# Patient Record
Sex: FEMALE | Race: WHITE | NOT HISPANIC OR LATINO
[De-identification: names, ages, dates, MRNs, and addresses within clinical notes are randomized per-mention and may not be internally consistent; named-entity substitution may affect disease eponyms.]

---

## 2023-01-18 ENCOUNTER — NON-APPOINTMENT (OUTPATIENT)
Age: 33
End: 2023-01-18

## 2023-01-19 ENCOUNTER — ASOB RESULT (OUTPATIENT)
Age: 33
End: 2023-01-19

## 2023-01-19 ENCOUNTER — APPOINTMENT (OUTPATIENT)
Dept: ANTEPARTUM | Facility: CLINIC | Age: 33
End: 2023-01-19
Payer: COMMERCIAL

## 2023-01-19 PROCEDURE — 76801 OB US < 14 WKS SINGLE FETUS: CPT

## 2023-01-21 ENCOUNTER — TRANSCRIPTION ENCOUNTER (OUTPATIENT)
Age: 33
End: 2023-01-21

## 2023-01-26 ENCOUNTER — ASOB RESULT (OUTPATIENT)
Age: 33
End: 2023-01-26

## 2023-01-26 ENCOUNTER — APPOINTMENT (OUTPATIENT)
Dept: ANTEPARTUM | Facility: CLINIC | Age: 33
End: 2023-01-26
Payer: COMMERCIAL

## 2023-01-26 PROCEDURE — 76817 TRANSVAGINAL US OBSTETRIC: CPT

## 2023-10-16 ENCOUNTER — APPOINTMENT (OUTPATIENT)
Dept: ANTEPARTUM | Facility: CLINIC | Age: 33
End: 2023-10-16
Payer: COMMERCIAL

## 2023-10-16 ENCOUNTER — ASOB RESULT (OUTPATIENT)
Age: 33
End: 2023-10-16

## 2023-10-16 PROCEDURE — 76819 FETAL BIOPHYS PROFIL W/O NST: CPT

## 2023-10-16 PROCEDURE — 76816 OB US FOLLOW-UP PER FETUS: CPT

## 2023-11-06 ENCOUNTER — ASOB RESULT (OUTPATIENT)
Age: 33
End: 2023-11-06

## 2023-11-06 ENCOUNTER — APPOINTMENT (OUTPATIENT)
Dept: ANTEPARTUM | Facility: CLINIC | Age: 33
End: 2023-11-06
Payer: COMMERCIAL

## 2023-11-06 PROCEDURE — 76820 UMBILICAL ARTERY ECHO: CPT | Mod: 59

## 2023-11-06 PROCEDURE — 76816 OB US FOLLOW-UP PER FETUS: CPT

## 2023-11-06 PROCEDURE — 76819 FETAL BIOPHYS PROFIL W/O NST: CPT

## 2023-11-27 ENCOUNTER — ASOB RESULT (OUTPATIENT)
Age: 33
End: 2023-11-27

## 2023-11-27 ENCOUNTER — APPOINTMENT (OUTPATIENT)
Dept: ANTEPARTUM | Facility: CLINIC | Age: 33
End: 2023-11-27
Payer: COMMERCIAL

## 2023-11-27 PROCEDURE — 76820 UMBILICAL ARTERY ECHO: CPT | Mod: 59

## 2023-11-27 PROCEDURE — 76816 OB US FOLLOW-UP PER FETUS: CPT

## 2023-11-27 PROCEDURE — 76819 FETAL BIOPHYS PROFIL W/O NST: CPT

## 2023-12-10 ENCOUNTER — TRANSCRIPTION ENCOUNTER (OUTPATIENT)
Age: 33
End: 2023-12-10

## 2023-12-10 ENCOUNTER — INPATIENT (INPATIENT)
Facility: HOSPITAL | Age: 33
LOS: 3 days | Discharge: HOME | End: 2023-12-14
Attending: OBSTETRICS & GYNECOLOGY | Admitting: OBSTETRICS & GYNECOLOGY
Payer: COMMERCIAL

## 2023-12-10 VITALS
DIASTOLIC BLOOD PRESSURE: 79 MMHG | WEIGHT: 209.44 LBS | OXYGEN SATURATION: 98 % | TEMPERATURE: 98 F | RESPIRATION RATE: 17 BRPM | SYSTOLIC BLOOD PRESSURE: 124 MMHG | HEART RATE: 107 BPM | HEIGHT: 64 IN

## 2023-12-10 DIAGNOSIS — Z98.890 OTHER SPECIFIED POSTPROCEDURAL STATES: Chronic | ICD-10-CM

## 2023-12-10 LAB
BASOPHILS # BLD AUTO: 0.03 K/UL — SIGNIFICANT CHANGE UP (ref 0–0.2)
BASOPHILS NFR BLD AUTO: 0.3 % — SIGNIFICANT CHANGE UP (ref 0–2)
EOSINOPHIL # BLD AUTO: 0.1 K/UL — SIGNIFICANT CHANGE UP (ref 0–0.5)
EOSINOPHIL NFR BLD AUTO: 1 % — SIGNIFICANT CHANGE UP (ref 0–6)
HCT VFR BLD CALC: 35.6 % — SIGNIFICANT CHANGE UP (ref 34.5–45)
HGB BLD-MCNC: 12.4 G/DL — SIGNIFICANT CHANGE UP (ref 11.5–15.5)
IMM GRANULOCYTES NFR BLD AUTO: 0.5 % — SIGNIFICANT CHANGE UP (ref 0–0.9)
LYMPHOCYTES # BLD AUTO: 2.39 K/UL — SIGNIFICANT CHANGE UP (ref 1–3.3)
LYMPHOCYTES # BLD AUTO: 24 % — SIGNIFICANT CHANGE UP (ref 13–44)
MCHC RBC-ENTMCNC: 30.8 PG — SIGNIFICANT CHANGE UP (ref 27–34)
MCHC RBC-ENTMCNC: 34.8 GM/DL — SIGNIFICANT CHANGE UP (ref 32–36)
MCV RBC AUTO: 88.6 FL — SIGNIFICANT CHANGE UP (ref 80–100)
MONOCYTES # BLD AUTO: 0.87 K/UL — SIGNIFICANT CHANGE UP (ref 0–0.9)
MONOCYTES NFR BLD AUTO: 8.7 % — SIGNIFICANT CHANGE UP (ref 2–14)
NEUTROPHILS # BLD AUTO: 6.51 K/UL — SIGNIFICANT CHANGE UP (ref 1.8–7.4)
NEUTROPHILS NFR BLD AUTO: 65.5 % — SIGNIFICANT CHANGE UP (ref 43–77)
NRBC # BLD: 0 /100 WBCS — SIGNIFICANT CHANGE UP (ref 0–0)
PLATELET # BLD AUTO: 296 K/UL — SIGNIFICANT CHANGE UP (ref 150–400)
RBC # BLD: 4.02 M/UL — SIGNIFICANT CHANGE UP (ref 3.8–5.2)
RBC # FLD: 11.3 % — SIGNIFICANT CHANGE UP (ref 10.3–14.5)
WBC # BLD: 9.95 K/UL — SIGNIFICANT CHANGE UP (ref 3.8–10.5)
WBC # FLD AUTO: 9.95 K/UL — SIGNIFICANT CHANGE UP (ref 3.8–10.5)

## 2023-12-10 RX ORDER — OXYTOCIN 10 UNIT/ML
333.33 VIAL (ML) INJECTION
Qty: 20 | Refills: 0 | Status: DISCONTINUED | OUTPATIENT
Start: 2023-12-10 | End: 2023-12-11

## 2023-12-10 RX ORDER — ALBUTEROL 90 UG/1
2 AEROSOL, METERED ORAL EVERY 6 HOURS
Refills: 0 | Status: DISCONTINUED | OUTPATIENT
Start: 2023-12-10 | End: 2023-12-14

## 2023-12-10 RX ORDER — CITRIC ACID/SODIUM CITRATE 300-500 MG
15 SOLUTION, ORAL ORAL EVERY 6 HOURS
Refills: 0 | Status: DISCONTINUED | OUTPATIENT
Start: 2023-12-10 | End: 2023-12-11

## 2023-12-10 RX ORDER — SODIUM CHLORIDE 9 MG/ML
1000 INJECTION, SOLUTION INTRAVENOUS
Refills: 0 | Status: DISCONTINUED | OUTPATIENT
Start: 2023-12-10 | End: 2023-12-11

## 2023-12-10 RX ORDER — CHLORHEXIDINE GLUCONATE 213 G/1000ML
1 SOLUTION TOPICAL DAILY
Refills: 0 | Status: DISCONTINUED | OUTPATIENT
Start: 2023-12-10 | End: 2023-12-11

## 2023-12-10 RX ADMIN — SODIUM CHLORIDE 125 MILLILITER(S): 9 INJECTION, SOLUTION INTRAVENOUS at 23:54

## 2023-12-10 NOTE — OB RN PATIENT PROFILE - NS PRO DEPRESSION SCREENING Y/N1
Pt no longer a Pt here- Pt moved to [place] and has a new PCP.  Pt will contact [facility] again
no

## 2023-12-10 NOTE — OB PROVIDER H&P - HISTORY OF PRESENT ILLNESS
33y  at 40w4d presents for induction of labor for LGA. She denies regular contractions, leakage of fluid, vaginal bleeding. Endorses fetal movement.    Ante:  -spontaneous pregnancy  -NIPT/anatomy wnl, fetus just diagnosed with right pyelectasis  -GCT wnl  -no hypertensive disorders of pregnancy  -GBS negative  -EFW 4250g    Ob Hx: G1  mAB medical  G2: current  Gyn Hx: denies  PMHx: asthma, never hospitalized or intubated  PSHx: hemangioma removal from left hand  Meds: PNV, albuterol last used 6 months ago  Allergies: sulfa-childhood     Physical Exam:  T(C): 36.8 (12-10-23 @ 22:11), Max: 36.8 (12-10-23 @ 22:11)  HR: 107 (12-10-23 @ 22:11) (107 - 107)  BP: 124/79 (12-10-23 @ 22:11) (124/79 - 124/79)  RR: 17 (12-10-23 @ 22:11) (17 - 17)  SpO2: 98% (12-10-23 @ 22:11) (98% - 98%)  General: comfortable appearing  Abdomen: soft, non-tender, gravid  TAUS: cephalic presentation  SVE: 1/50/-3    FHT: 140 baseline, moderate variability, +accels, no decels  [place]: not steff    A/P  33y  at 40w4d presents for induction of labor.  -admit to labor and delivery  -induce with fischer balloon and pitocin  -GBS negative  -epidural PRN  -consents signed, all questions answered, patient expressed understanding  -discussed with senior resident Dr. Waterman  -discussed with attending Dr. Roland 33y  at 40w4d presents for induction of labor for LGA. She denies regular contractions, leakage of fluid, vaginal bleeding. Endorses fetal movement.    Ante:  -spontaneous pregnancy  -NIPT/anatomy wnl, fetus just diagnosed with right pyelectasis  -GCT wnl  -no hypertensive disorders of pregnancy  -GBS negative  -EFW 4250g    Ob Hx: G1  mAB medical  G2: current  Gyn Hx: denies  PMHx: asthma, never hospitalized or intubated  PSHx: hemangioma removal from left hand  Meds: PNV, albuterol last used 6 months ago  Allergies: sulfa-childhood     Physical Exam:  T(C): 36.8 (12-10-23 @ 22:11), Max: 36.8 (12-10-23 @ 22:11)  HR: 107 (12-10-23 @ 22:11) (107 - 107)  BP: 124/79 (12-10-23 @ 22:11) (124/79 - 124/79)  RR: 17 (12-10-23 @ 22:11) (17 - 17)  SpO2: 98% (12-10-23 @ 22:11) (98% - 98%)  General: comfortable appearing  Abdomen: soft, non-tender, gravid  TAUS: cephalic presentation  SVE: 1/50/-3    FHT: 140 baseline, moderate variability, +accels, no decels  [place]: not steff    A/P  33y  at 40w4d presents for induction of labor.  -admit to labor and delivery  -induce with fiscehr balloon and pitocin  -GBS negative  -epidural PRN  -consents signed, all questions answered, patient expressed understanding  -discussed with senior resident Dr. Waterman  -discussed with attending Dr. Roland

## 2023-12-10 NOTE — OB RN PATIENT PROFILE - NS_PRENATALSTART_OBGYN_ALL_OB
I have personally seen and examined the patient. I have collaborated with and supervised the Started first trimester

## 2023-12-10 NOTE — OB PROVIDER H&P - NSLOWPPHRISK_OBGYN_A_OB
No previous uterine incision/[place] Pregnancy/Less than or equal to 4 previous vaginal births/No known bleeding disorder/No history of postpartum hemorrhage

## 2023-12-10 NOTE — OB RN PATIENT PROFILE - FALL HARM RISK - UNIVERSAL INTERVENTIONS
Bed in lowest position, wheels locked, appropriate side rails in place/Call bell, personal items and telephone in reach/Instruct patient to call for assistance before getting out of bed or chair/Non-slip footwear when patient is out of bed/[place] to call system/Physically safe environment - no spills, clutter or unnecessary equipment/Purposeful Proactive Rounding/Room/bathroom lighting operational, light cord in reach Bed in lowest position, wheels locked, appropriate side rails in place/Call bell, personal items and telephone in reach/Instruct patient to call for assistance before getting out of bed or chair/Non-slip footwear when patient is out of bed/[place] to call system/Physically safe environment - no spills, clutter or unnecessary equipment/Purposeful Proactive Rounding/Room/bathroom lighting operational, light cord in reach

## 2023-12-11 LAB
ALBUMIN SERPL ELPH-MCNC: 3.1 G/DL — LOW (ref 3.3–5)
ALP SERPL-CCNC: 301 U/L — HIGH (ref 40–120)
ALT FLD-CCNC: 56 U/L — HIGH (ref 10–45)
ANION GAP SERPL CALC-SCNC: 12 MMOL/L — SIGNIFICANT CHANGE UP (ref 5–17)
APTT BLD: 27.1 SEC — SIGNIFICANT CHANGE UP (ref 24.5–35.6)
AST SERPL-CCNC: 31 U/L — SIGNIFICANT CHANGE UP (ref 10–40)
BASOPHILS # BLD AUTO: 0.03 K/UL — SIGNIFICANT CHANGE UP (ref 0–0.2)
BASOPHILS NFR BLD AUTO: 0.2 % — SIGNIFICANT CHANGE UP (ref 0–2)
BILIRUB SERPL-MCNC: 0.4 MG/DL — SIGNIFICANT CHANGE UP (ref 0.2–1.2)
BLD GP AB SCN SERPL QL: NEGATIVE — SIGNIFICANT CHANGE UP
BUN SERPL-MCNC: 7 MG/DL — SIGNIFICANT CHANGE UP (ref 7–23)
CALCIUM SERPL-MCNC: 9.3 MG/DL — SIGNIFICANT CHANGE UP (ref 8.4–10.5)
CHLORIDE SERPL-SCNC: 100 MMOL/L — SIGNIFICANT CHANGE UP (ref 96–108)
CO2 SERPL-SCNC: 20 MMOL/L — LOW (ref 22–31)
CREAT ?TM UR-MCNC: 79 MG/DL — SIGNIFICANT CHANGE UP
CREAT SERPL-MCNC: 0.54 MG/DL — SIGNIFICANT CHANGE UP (ref 0.5–1.3)
EGFR: 125 ML/MIN/1.73M2 — SIGNIFICANT CHANGE UP
EOSINOPHIL # BLD AUTO: 0.04 K/UL — SIGNIFICANT CHANGE UP (ref 0–0.5)
EOSINOPHIL NFR BLD AUTO: 0.3 % — SIGNIFICANT CHANGE UP (ref 0–6)
FIBRINOGEN PPP-MCNC: 573 MG/DL — HIGH (ref 200–445)
GLUCOSE SERPL-MCNC: 78 MG/DL — SIGNIFICANT CHANGE UP (ref 70–99)
HCT VFR BLD CALC: 33.7 % — LOW (ref 34.5–45)
HGB BLD-MCNC: 11.7 G/DL — SIGNIFICANT CHANGE UP (ref 11.5–15.5)
IMM GRANULOCYTES NFR BLD AUTO: 0.6 % — SIGNIFICANT CHANGE UP (ref 0–0.9)
INR BLD: 0.91 — SIGNIFICANT CHANGE UP (ref 0.85–1.18)
LDH SERPL L TO P-CCNC: 104 U/L — SIGNIFICANT CHANGE UP (ref 50–242)
LYMPHOCYTES # BLD AUTO: 1.75 K/UL — SIGNIFICANT CHANGE UP (ref 1–3.3)
LYMPHOCYTES # BLD AUTO: 13.4 % — SIGNIFICANT CHANGE UP (ref 13–44)
MCHC RBC-ENTMCNC: 31.2 PG — SIGNIFICANT CHANGE UP (ref 27–34)
MCHC RBC-ENTMCNC: 34.7 GM/DL — SIGNIFICANT CHANGE UP (ref 32–36)
MCV RBC AUTO: 89.9 FL — SIGNIFICANT CHANGE UP (ref 80–100)
MONOCYTES # BLD AUTO: 0.83 K/UL — SIGNIFICANT CHANGE UP (ref 0–0.9)
MONOCYTES NFR BLD AUTO: 6.4 % — SIGNIFICANT CHANGE UP (ref 2–14)
NEUTROPHILS # BLD AUTO: 10.29 K/UL — HIGH (ref 1.8–7.4)
NEUTROPHILS NFR BLD AUTO: 79.1 % — HIGH (ref 43–77)
NRBC # BLD: 0 /100 WBCS — SIGNIFICANT CHANGE UP (ref 0–0)
PLATELET # BLD AUTO: 272 K/UL — SIGNIFICANT CHANGE UP (ref 150–400)
POTASSIUM SERPL-MCNC: 3.8 MMOL/L — SIGNIFICANT CHANGE UP (ref 3.5–5.3)
POTASSIUM SERPL-SCNC: 3.8 MMOL/L — SIGNIFICANT CHANGE UP (ref 3.5–5.3)
PROT ?TM UR-MCNC: 49 MG/DL — HIGH (ref 0–12)
PROT SERPL-MCNC: 6.4 G/DL — SIGNIFICANT CHANGE UP (ref 6–8.3)
PROT/CREAT UR-RTO: 0.6 RATIO — HIGH (ref 0–0.2)
PROTHROM AB SERPL-ACNC: 10.4 SEC — SIGNIFICANT CHANGE UP (ref 9.5–13)
RBC # BLD: 3.75 M/UL — LOW (ref 3.8–5.2)
RBC # FLD: 11.8 % — SIGNIFICANT CHANGE UP (ref 10.3–14.5)
RH IG SCN BLD-IMP: POSITIVE — SIGNIFICANT CHANGE UP
SODIUM SERPL-SCNC: 132 MMOL/L — LOW (ref 135–145)
URATE SERPL-MCNC: 6 MG/DL — SIGNIFICANT CHANGE UP (ref 2.5–7)
WBC # BLD: 13.02 K/UL — HIGH (ref 3.8–10.5)
WBC # FLD AUTO: 13.02 K/UL — HIGH (ref 3.8–10.5)

## 2023-12-11 PROCEDURE — 88307 TISSUE EXAM BY PATHOLOGIST: CPT | Mod: 26

## 2023-12-11 DEVICE — SEPRAFILM 5 X 6": Type: IMPLANTABLE DEVICE | Status: FUNCTIONAL

## 2023-12-11 DEVICE — ARISTA 3GR: Type: IMPLANTABLE DEVICE | Status: FUNCTIONAL

## 2023-12-11 RX ORDER — SODIUM CHLORIDE 9 MG/ML
1000 INJECTION, SOLUTION INTRAVENOUS
Refills: 0 | Status: DISCONTINUED | OUTPATIENT
Start: 2023-12-11 | End: 2023-12-14

## 2023-12-11 RX ORDER — OXYTOCIN 10 UNIT/ML
333.33 VIAL (ML) INJECTION
Qty: 20 | Refills: 0 | Status: DISCONTINUED | OUTPATIENT
Start: 2023-12-11 | End: 2023-12-14

## 2023-12-11 RX ORDER — ENOXAPARIN SODIUM 100 MG/ML
40 INJECTION SUBCUTANEOUS EVERY 24 HOURS
Refills: 0 | Status: DISCONTINUED | OUTPATIENT
Start: 2023-12-12 | End: 2023-12-14

## 2023-12-11 RX ORDER — ACETAMINOPHEN 500 MG
975 TABLET ORAL
Refills: 0 | Status: DISCONTINUED | OUTPATIENT
Start: 2023-12-11 | End: 2023-12-14

## 2023-12-11 RX ORDER — ACETAMINOPHEN 500 MG
1000 TABLET ORAL ONCE
Refills: 0 | Status: COMPLETED | OUTPATIENT
Start: 2023-12-11 | End: 2023-12-11

## 2023-12-11 RX ORDER — TETANUS TOXOID, REDUCED DIPHTHERIA TOXOID AND ACELLULAR PERTUSSIS VACCINE, ADSORBED 5; 2.5; 8; 8; 2.5 [IU]/.5ML; [IU]/.5ML; UG/.5ML; UG/.5ML; UG/.5ML
0.5 SUSPENSION INTRAMUSCULAR ONCE
Refills: 0 | Status: DISCONTINUED | OUTPATIENT
Start: 2023-12-11 | End: 2023-12-14

## 2023-12-11 RX ORDER — OXYCODONE HYDROCHLORIDE 5 MG/1
5 TABLET ORAL
Refills: 0 | Status: DISCONTINUED | OUTPATIENT
Start: 2023-12-11 | End: 2023-12-14

## 2023-12-11 RX ORDER — MAGNESIUM HYDROXIDE 400 MG/1
30 TABLET, CHEWABLE ORAL
Refills: 0 | Status: DISCONTINUED | OUTPATIENT
Start: 2023-12-11 | End: 2023-12-14

## 2023-12-11 RX ORDER — CITRIC ACID/SODIUM CITRATE 300-500 MG
30 SOLUTION, ORAL ORAL ONCE
Refills: 0 | Status: COMPLETED | OUTPATIENT
Start: 2023-12-11 | End: 2023-12-11

## 2023-12-11 RX ORDER — FENTANYL/BUPIVACAINE/NS/PF 2MCG/ML-.1
250 PLASTIC BAG, INJECTION (ML) INJECTION
Refills: 0 | Status: DISCONTINUED | OUTPATIENT
Start: 2023-12-11 | End: 2023-12-14

## 2023-12-11 RX ORDER — NALOXONE HYDROCHLORIDE 4 MG/.1ML
0.1 SPRAY NASAL
Refills: 0 | Status: DISCONTINUED | OUTPATIENT
Start: 2023-12-11 | End: 2023-12-14

## 2023-12-11 RX ORDER — LANOLIN
1 OINTMENT (GRAM) TOPICAL EVERY 6 HOURS
Refills: 0 | Status: DISCONTINUED | OUTPATIENT
Start: 2023-12-11 | End: 2023-12-14

## 2023-12-11 RX ORDER — AZITHROMYCIN 500 MG/1
500 TABLET, FILM COATED ORAL ONCE
Refills: 0 | Status: COMPLETED | OUTPATIENT
Start: 2023-12-11 | End: 2023-12-11

## 2023-12-11 RX ORDER — LIDOCAINE 4 G/100G
1 CREAM TOPICAL ONCE
Refills: 0 | Status: DISCONTINUED | OUTPATIENT
Start: 2023-12-11 | End: 2023-12-11

## 2023-12-11 RX ORDER — OXYTOCIN 10 UNIT/ML
VIAL (ML) INJECTION
Qty: 30 | Refills: 0 | Status: DISCONTINUED | OUTPATIENT
Start: 2023-12-11 | End: 2023-12-11

## 2023-12-11 RX ORDER — KETOROLAC TROMETHAMINE 30 MG/ML
30 SYRINGE (ML) INJECTION EVERY 6 HOURS
Refills: 0 | Status: DISCONTINUED | OUTPATIENT
Start: 2023-12-11 | End: 2023-12-12

## 2023-12-11 RX ORDER — DEXAMETHASONE 0.5 MG/5ML
4 ELIXIR ORAL EVERY 6 HOURS
Refills: 0 | Status: DISCONTINUED | OUTPATIENT
Start: 2023-12-11 | End: 2023-12-14

## 2023-12-11 RX ORDER — IBUPROFEN 200 MG
600 TABLET ORAL EVERY 6 HOURS
Refills: 0 | Status: COMPLETED | OUTPATIENT
Start: 2023-12-11 | End: 2024-11-08

## 2023-12-11 RX ORDER — ONDANSETRON 8 MG/1
4 TABLET, FILM COATED ORAL EVERY 6 HOURS
Refills: 0 | Status: DISCONTINUED | OUTPATIENT
Start: 2023-12-11 | End: 2023-12-14

## 2023-12-11 RX ORDER — ACETAMINOPHEN 500 MG
1000 TABLET ORAL ONCE
Refills: 0 | Status: DISCONTINUED | OUTPATIENT
Start: 2023-12-11 | End: 2023-12-12

## 2023-12-11 RX ORDER — CEFAZOLIN SODIUM 1 G
2000 VIAL (EA) INJECTION ONCE
Refills: 0 | Status: COMPLETED | OUTPATIENT
Start: 2023-12-11 | End: 2023-12-11

## 2023-12-11 RX ORDER — LIDOCAINE 4 G/100G
1 CREAM TOPICAL ONCE
Refills: 0 | Status: COMPLETED | OUTPATIENT
Start: 2023-12-11 | End: 2023-12-11

## 2023-12-11 RX ORDER — DIPHENHYDRAMINE HCL 50 MG
25 CAPSULE ORAL EVERY 6 HOURS
Refills: 0 | Status: DISCONTINUED | OUTPATIENT
Start: 2023-12-11 | End: 2023-12-14

## 2023-12-11 RX ORDER — SIMETHICONE 80 MG/1
80 TABLET, CHEWABLE ORAL EVERY 4 HOURS
Refills: 0 | Status: DISCONTINUED | OUTPATIENT
Start: 2023-12-11 | End: 2023-12-14

## 2023-12-11 RX ORDER — LIDOCAINE 4 G/100G
3 CREAM TOPICAL ONCE
Refills: 0 | Status: COMPLETED | OUTPATIENT
Start: 2023-12-11 | End: 2023-12-11

## 2023-12-11 RX ORDER — OXYCODONE HYDROCHLORIDE 5 MG/1
5 TABLET ORAL ONCE
Refills: 0 | Status: DISCONTINUED | OUTPATIENT
Start: 2023-12-11 | End: 2023-12-14

## 2023-12-11 RX ADMIN — LIDOCAINE 2 PATCH: 4 CREAM TOPICAL at 18:51

## 2023-12-11 RX ADMIN — Medication 400 MILLIGRAM(S): at 14:04

## 2023-12-11 RX ADMIN — LIDOCAINE 1 PATCH: 4 CREAM TOPICAL at 16:16

## 2023-12-11 RX ADMIN — Medication 2 MILLIUNIT(S)/MIN: at 02:05

## 2023-12-11 RX ADMIN — Medication 250 MILLILITER(S): at 03:00

## 2023-12-11 RX ADMIN — Medication 1000 MILLIGRAM(S): at 20:42

## 2023-12-11 RX ADMIN — Medication 30 MILLIGRAM(S): at 23:42

## 2023-12-11 RX ADMIN — Medication 30 MILLILITER(S): at 21:20

## 2023-12-11 RX ADMIN — Medication 100 MILLIGRAM(S): at 21:19

## 2023-12-11 RX ADMIN — AZITHROMYCIN 255 MILLIGRAM(S): 500 TABLET, FILM COATED ORAL at 21:50

## 2023-12-11 NOTE — OB PROVIDER LABOR PROGRESS NOTE - ASSESSMENT
- IV Tylenol ordered for pain control; will re-attempt VE when pt is more comfortable  - Now meets criteria for gestational hypertension.  Full PEC with P/C ordered, will f/u.

## 2023-12-11 NOTE — OB PROVIDER DELIVERY SUMMARY - NSATTENDATTESTATION_OBGYN_A_OB
I was physically present and participated in this delivery.
I was physically present and participated in this delivery.

## 2023-12-11 NOTE — OB PROVIDER DELIVERY SUMMARY - NSPROVIDERDELIVERYNOTE_OBGYN_ALL_OB_FT
32 yo  s/p pC/S for maternal request after IOL at 40w5d for LGA. Patient had a viable female infant, placenta delivered spontaneously intact. Uterus closed with #1 chromic in two layers. January and sepra film used over hysterotomy site. Muscle closed with chromic. Fascia closed with 2-0 vicryl. Subq closed in two layers. Skin closed with 3-0 monocryl on curved needle. Excellent hemostasis. EBL 1000, IVF 1200, . Patient tolerated procedure well. Mother and infant in stable condition.   Dictation # [id number] 34 yo  s/p pC/S for maternal request after IOL at 40w5d for LGA. Patient had a viable female infant, placenta delivered spontaneously intact. Uterus closed with #1 chromic in two layers. January and sepra film used over hysterotomy site. Muscle closed with chromic. Fascia closed with 2-0 vicryl. Subq closed in two layers. Skin closed with 3-0 monocryl on curved needle. Excellent hemostasis. EBL 1000, IVF 1200, . Patient tolerated procedure well. Mother and infant in stable condition.   Dictation # [id number]

## 2023-12-11 NOTE — OB PROVIDER DELIVERY SUMMARY - NSSELHIDDEN_OBGYN_ALL_OB_FT
[NS_DeliveryAttending1_OBGYN_ALL_OB_FT:[id number]==],[NS_DeliveryAssist1_OBGYN_ALL_OB_FT:[id number]],[NS_DeliveryRN_OBGYN_ALL_OB_FT:[id number]] [NS_DeliveryAttending1_OBGYN_ALL_OB_FT:[id number]==],[NS_DeliveryAssist1_OBGYN_ALL_OB_FT:[id number]=],[NS_DeliveryRN_OBGYN_ALL_OB_FT:[id number]=]

## 2023-12-11 NOTE — OB PROVIDER LABOR PROGRESS NOTE - NS_SUBJECTIVE/OBJECTIVE_OBGYN_ALL_OB_FT
Patient examined for increased discomfort
SVE 6-7/80/-1
tracing notes
Patient reports pain, unable to lay flat for exam.
fischer balloon placed 80/80 to tension  SVE 2/70/-3
Pt comfortable with epidural
tracing note
Patient is in a lot of pain and is very tearful. She states that she cannot push. She has upper back pain and has a lidocaine patch but she is in too much pain when she gets into position to push. She want a  section. Discussed risks, benefits, indications, and alternatives to CS reviewed with the patient. Informed consent obtained. Patient expresses understanding and ask appropriate questions.
Patient is now comfortable with lidocaine patch.
Pt comfortable
pt c/o of upper back pain

## 2023-12-11 NOTE — OB PROVIDER LABOR PROGRESS NOTE - ASSESSMENT
unable to tolerate laying down  sat pt up  dw pt re pushing  will reexamine in  30 min  lidocain patch

## 2023-12-11 NOTE — OB PROVIDER LABOR PROGRESS NOTE - NSVAGINALEXAM_OBGYN_ALL_OB_DT
11-Dec-2023 09:25
11-Dec-2023 10:29
11-Dec-2023 19:53
11-Dec-2023 18:51
11-Dec-2023 15:45
11-Dec-2023 16:00

## 2023-12-11 NOTE — OB RN PREOPERATIVE CHECKLIST - NS PREOP CHK MONITOR ANESTHESIA CONSENT
Retinal tear and detachment warning symptoms reviewed and patient instructed to call immediately if increasing floaters, flashes, or decreasing peripheral vision. done

## 2023-12-11 NOTE — OB PROVIDER LABOR PROGRESS NOTE - NS_OBIHIFHRDETAILS_OBGYN_ALL_OB_FT
Baseline 150/mod philipp/+accel/no decel. Cat I.  [place] q 2 min Baseline 150/mod philipp/+accel/no decel. Cat I.  [place] q 2 min

## 2023-12-11 NOTE — OB RN DELIVERY SUMMARY - NS_DELIVERYATTENDING1_OBGYN_ALL_OB_FT
Patient left message that she had egd done yesterday and you were going to order carafate and it wasn't at the pharmacy  Please send it to pharmacy   Thank you
Kyra Aden DO

## 2023-12-11 NOTE — PRE-ANESTHESIA EVALUATION ADULT - NSANTHOSAYNRD_GEN_A_CORE
No. MALINDA screening performed.  STOP BANG Legend: 0-2 = LOW Risk; 3-4 = INTERMEDIATE Risk; 5-8 = HIGH Risk

## 2023-12-11 NOTE — OB PROVIDER LABOR PROGRESS NOTE - ASSESSMENT
- s/p cervical balloon  - pitocin 16mU/min  - Epidural in place  - attending in house  - consider AROM with next exam

## 2023-12-11 NOTE — OB PROVIDER LABOR PROGRESS NOTE - ASSESSMENT
34 yo  @ 40w4d undergoing IOL, suspected LGA, cat 1 tracing    fb out  titrate pit  pt repositioned to throne  will attempt arom next exam 32 yo  @ 40w4d undergoing IOL, suspected LGA, cat 1 tracing    fb out  titrate pit  pt repositioned to throne  will attempt arom next exam

## 2023-12-11 NOTE — OB PROVIDER LABOR PROGRESS NOTE - NS_OBIHICONTRACTIONPATTERNDETAILS_OBGYN_ALL_OB_FT
Contractions q2min.
ctx irregular
irregular contractions
3-4 contractions in 10 minutes
irregular contractions q2-3min
irregular contractions q2-5min
irregular ctx q3 mins
ctx q 2 minutes
regular contractions a 1-2  pt not aware of when she is steff
ctx q 2-4 min
Ctx 5 in 10. Pit @15mu.
q2 min

## 2023-12-11 NOTE — OB RN INTRAOPERATIVE NOTE - NSSELHIDDEN_OBGYN_ALL_OB_FT
[NS_DeliveryAttending1_OBGYN_ALL_OB_FT:[id number]==],[NS_DeliveryAssist1_OBGYN_ALL_OB_FT:[id number]=],[NS_DeliveryRN_OBGYN_ALL_OB_FT:[id number]] [NS_DeliveryAttending1_OBGYN_ALL_OB_FT:[id number]],[NS_DeliveryAssist1_OBGYN_ALL_OB_FT:[id number]=],[NS_DeliveryRN_OBGYN_ALL_OB_FT:[id number]=]

## 2023-12-11 NOTE — OB PROVIDER DELIVERY SUMMARY - NSLOWPPHRISK_OBGYN_A_OB
No previous uterine incision/[place] Pregnancy/Less than or equal to 4 previous vaginal births/No known bleeding disorder/No history of postpartum hemorrhage
No previous uterine incision/[place] Pregnancy/Less than or equal to 4 previous vaginal births/No known bleeding disorder/No history of postpartum hemorrhage

## 2023-12-11 NOTE — OB PROVIDER DELIVERY SUMMARY - NSPROVIDERDELIVERYNOTE_OBGYN_ALL_OB_FT
over intact perineum  oa no nuchal cord  baby emerged crying vigorously  spont delivery of placenta intact with 3 v cord  uterus examined and found to be clean  repair of 2nd degree lac with good hemostasis and cosmesis  rectum intact mother and baby doing well

## 2023-12-11 NOTE — OB RN DELIVERY SUMMARY - NSSELHIDDEN_OBGYN_ALL_OB_FT
[NS_DeliveryAttending1_OBGYN_ALL_OB_FT:[id number]==],[NS_DeliveryAssist1_OBGYN_ALL_OB_FT:[id number]],[NS_DeliveryRN_OBGYN_ALL_OB_FT:[id number]] [NS_DeliveryAttending1_OBGYN_ALL_OB_FT:[id number]==],[NS_DeliveryAssist1_OBGYN_ALL_OB_FT:[id number]=],[NS_DeliveryRN_OBGYN_ALL_OB_FT:[id number]=]

## 2023-12-11 NOTE — OB RN DELIVERY SUMMARY - NS_SEPSISRSKCALC_OBGYN_ALL_OB_FT
EOS calculated successfully. EOS Risk Factor: 0.5/1000 live births ([facility] national incidence); GA=40w5d; Temp=99.1; ROM=11.633; GBS='Negative'; Antibiotics='Broad spectrum antibiotics > 4 hrs prior to birth'   EOS calculated successfully. EOS Risk Factor: 0.5/1000 live births ([facility] national incidence); GA=40w5d; Temp=99.1; ROM=11.633; GBS='Negative'; Antibiotics='Broad spectrum antibiotics > 4 hrs prior to birth'

## 2023-12-11 NOTE — OB PROVIDER DELIVERY SUMMARY - NSMODPPHRISK_OBGYN_A_OB
Over-distended uterus: Multiple gestation, polyhydramnios, Macrosomia
Over-distended uterus: Multiple gestation, polyhydramnios, Macrosomia

## 2023-12-11 NOTE — OB PROVIDER DELIVERY SUMMARY - NSSELHIDDEN_OBGYN_ALL_OB_FT
[NS_DeliveryAttending1_OBGYN_ALL_OB_FT:[id number]] [NS_DeliveryAttending1_OBGYN_ALL_OB_FT:[id number]==]

## 2023-12-12 PROBLEM — Z00.00 ENCOUNTER FOR PREVENTIVE HEALTH EXAMINATION: Status: ACTIVE | Noted: 2023-12-12

## 2023-12-12 LAB
BASOPHILS # BLD AUTO: 0.05 K/UL — SIGNIFICANT CHANGE UP (ref 0–0.2)
BASOPHILS NFR BLD AUTO: 0.3 % — SIGNIFICANT CHANGE UP (ref 0–2)
EOSINOPHIL # BLD AUTO: 0 K/UL — SIGNIFICANT CHANGE UP (ref 0–0.5)
EOSINOPHIL NFR BLD AUTO: 0 % — SIGNIFICANT CHANGE UP (ref 0–6)
HCT VFR BLD CALC: 28.5 % — LOW (ref 34.5–45)
HGB BLD-MCNC: 9.6 G/DL — LOW (ref 11.5–15.5)
IMM GRANULOCYTES NFR BLD AUTO: 0.8 % — SIGNIFICANT CHANGE UP (ref 0–0.9)
LYMPHOCYTES # BLD AUTO: 1.48 K/UL — SIGNIFICANT CHANGE UP (ref 1–3.3)
LYMPHOCYTES # BLD AUTO: 8.2 % — LOW (ref 13–44)
MCHC RBC-ENTMCNC: 30.7 PG — SIGNIFICANT CHANGE UP (ref 27–34)
MCHC RBC-ENTMCNC: 33.7 GM/DL — SIGNIFICANT CHANGE UP (ref 32–36)
MCV RBC AUTO: 91.1 FL — SIGNIFICANT CHANGE UP (ref 80–100)
MONOCYTES # BLD AUTO: 0.98 K/UL — HIGH (ref 0–0.9)
MONOCYTES NFR BLD AUTO: 5.4 % — SIGNIFICANT CHANGE UP (ref 2–14)
NEUTROPHILS # BLD AUTO: 15.45 K/UL — HIGH (ref 1.8–7.4)
NEUTROPHILS NFR BLD AUTO: 85.3 % — HIGH (ref 43–77)
NRBC # BLD: 0 /100 WBCS — SIGNIFICANT CHANGE UP (ref 0–0)
PLATELET # BLD AUTO: 217 K/UL — SIGNIFICANT CHANGE UP (ref 150–400)
RBC # BLD: 3.13 M/UL — LOW (ref 3.8–5.2)
RBC # FLD: 11.8 % — SIGNIFICANT CHANGE UP (ref 10.3–14.5)
T PALLIDUM AB TITR SER: NEGATIVE — SIGNIFICANT CHANGE UP
WBC # BLD: 18.11 K/UL — HIGH (ref 3.8–10.5)
WBC # FLD AUTO: 18.11 K/UL — HIGH (ref 3.8–10.5)

## 2023-12-12 RX ORDER — ACETAMINOPHEN 500 MG
1000 TABLET ORAL ONCE
Refills: 0 | Status: COMPLETED | OUTPATIENT
Start: 2023-12-12 | End: 2023-12-12

## 2023-12-12 RX ORDER — AMPICILLIN TRIHYDRATE 250 MG
2 CAPSULE ORAL EVERY 6 HOURS
Refills: 0 | Status: COMPLETED | OUTPATIENT
Start: 2023-12-12 | End: 2023-12-12

## 2023-12-12 RX ORDER — IBUPROFEN 200 MG
600 TABLET ORAL EVERY 6 HOURS
Refills: 0 | Status: DISCONTINUED | OUTPATIENT
Start: 2023-12-12 | End: 2023-12-14

## 2023-12-12 RX ORDER — GENTAMICIN SULFATE 40 MG/ML
350 VIAL (ML) INJECTION ONCE
Refills: 0 | Status: COMPLETED | OUTPATIENT
Start: 2023-12-12 | End: 2023-12-12

## 2023-12-12 RX ADMIN — Medication 975 MILLIGRAM(S): at 14:37

## 2023-12-12 RX ADMIN — Medication 400 MILLIGRAM(S): at 01:07

## 2023-12-12 RX ADMIN — Medication 975 MILLIGRAM(S): at 09:35

## 2023-12-12 RX ADMIN — Medication 30 MILLIGRAM(S): at 05:29

## 2023-12-12 RX ADMIN — Medication 30 MILLIGRAM(S): at 11:56

## 2023-12-12 RX ADMIN — LIDOCAINE 3 PATCH: 4 CREAM TOPICAL at 05:07

## 2023-12-12 RX ADMIN — ENOXAPARIN SODIUM 40 MILLIGRAM(S): 100 INJECTION SUBCUTANEOUS at 11:56

## 2023-12-12 RX ADMIN — Medication 200 MILLIGRAM(S): at 02:22

## 2023-12-12 RX ADMIN — Medication 100 MILLIGRAM(S): at 14:34

## 2023-12-12 RX ADMIN — Medication 30 MILLIGRAM(S): at 12:29

## 2023-12-12 RX ADMIN — Medication 100 MILLIGRAM(S): at 22:29

## 2023-12-12 RX ADMIN — Medication 975 MILLIGRAM(S): at 15:26

## 2023-12-12 RX ADMIN — Medication 216 GRAM(S): at 06:40

## 2023-12-12 RX ADMIN — Medication 975 MILLIGRAM(S): at 21:15

## 2023-12-12 RX ADMIN — Medication 216 GRAM(S): at 01:10

## 2023-12-12 RX ADMIN — Medication 216 GRAM(S): at 21:16

## 2023-12-12 RX ADMIN — Medication 30 MILLIGRAM(S): at 01:00

## 2023-12-12 RX ADMIN — Medication 216 GRAM(S): at 16:07

## 2023-12-12 RX ADMIN — Medication 1000 MILLIGRAM(S): at 01:30

## 2023-12-12 RX ADMIN — SIMETHICONE 80 MILLIGRAM(S): 80 TABLET, CHEWABLE ORAL at 05:18

## 2023-12-12 RX ADMIN — Medication 975 MILLIGRAM(S): at 10:26

## 2023-12-12 RX ADMIN — Medication 100 MILLIGRAM(S): at 05:17

## 2023-12-12 RX ADMIN — Medication 30 MILLIGRAM(S): at 19:05

## 2023-12-12 RX ADMIN — Medication 1 APPLICATION(S): at 05:18

## 2023-12-12 RX ADMIN — Medication 30 MILLIGRAM(S): at 19:30

## 2023-12-12 RX ADMIN — LIDOCAINE 1 PATCH: 4 CREAM TOPICAL at 04:41

## 2023-12-12 RX ADMIN — SODIUM CHLORIDE 125 MILLILITER(S): 9 INJECTION, SOLUTION INTRAVENOUS at 05:17

## 2023-12-12 RX ADMIN — Medication 30 MILLIGRAM(S): at 05:14

## 2023-12-12 NOTE — LACTATION INITIAL EVALUATION - NS LACT CON REASON FOR REQ
40wk gestation baby, about 14.5 hrs old at this time. Placed the baby STS with the mother while I provided breastfeeding education and explained normal  behaviour and the milk production feedback system. Assisted with positioning in a football hold on the right breast and taught latch strategies. Baby was able to latch deeply and is feeding well, rhythmically sucking between short pauses of rest. Mother to continue with STS when possible, room-in, and feed as per cues at least 8-12x/ day. To f/u as needed./inverted/flat nipples/primaparous mom/staff request/patient request

## 2023-12-12 NOTE — LACTATION INITIAL EVALUATION - LACTATION INTERVENTIONS
initiate/review safe skin-to-skin/initiate/review hand expression/initiate/review techniques for position and latch/post discharge community resources provided/review techniques to increase milk supply/reviewed components of an effective feeding and at least 8 effective feedings per day required/reviewed importance of monitoring infant diapers, the breastfeeding log, and minimum output each day/reviewed risks of artificial nipples/reviewed feeding on demand/by cue at least 8 times a day/recommended follow-up with pediatrician within 24 hours of discharge/reviewed indications of inadequate milk transfer that would require supplementation

## 2023-12-12 NOTE — PROGRESS NOTE ADULT - ASSESSMENT
A/P  yo 33y s/p c/s, POD #1, meeting postop milestones    hgb 9.6, asx acute blood loss anemia  Diet: advance diet as tolerated  Pain: OPM  IV fluid: LR @ 125cc/hr  will removed aaliyah TOESCOBAR  OOB/SCDs/IS  Continue to monitor  Anticipate discharge POD #3 or #4

## 2023-12-12 NOTE — PROGRESS NOTE ADULT - ASSESSMENT
33y Female POD#1  s/p C/S w/ chorio and PEC w/o SF                                       - Neuro/Pain:  toradol atc, tylenol atc, oxy prn  - CV:  VS per routine, AM CBC pending  - Pulm: Encourage ISS & Ambulation  - GI: Advance as tolerated  - : Rm in place, to be removed this morning, TOV this afternoon  - DVT ppx: SCDs, Lovenox 40mg QD  - Dispo: POD #2/3    PEC w/o SF  -no toxic complaints  -normotensive    Chorio  -febrile to 101.5 overnight, no afebrile  -A/G/C x 24 hours

## 2023-12-13 RX ADMIN — Medication 975 MILLIGRAM(S): at 02:38

## 2023-12-13 RX ADMIN — Medication 600 MILLIGRAM(S): at 12:30

## 2023-12-13 RX ADMIN — Medication 975 MILLIGRAM(S): at 21:18

## 2023-12-13 RX ADMIN — Medication 975 MILLIGRAM(S): at 15:13

## 2023-12-13 RX ADMIN — Medication 600 MILLIGRAM(S): at 23:35

## 2023-12-13 RX ADMIN — Medication 975 MILLIGRAM(S): at 09:15

## 2023-12-13 RX ADMIN — Medication 975 MILLIGRAM(S): at 15:30

## 2023-12-13 RX ADMIN — Medication 600 MILLIGRAM(S): at 06:34

## 2023-12-13 RX ADMIN — Medication 975 MILLIGRAM(S): at 09:29

## 2023-12-13 RX ADMIN — Medication 600 MILLIGRAM(S): at 17:36

## 2023-12-13 RX ADMIN — SIMETHICONE 80 MILLIGRAM(S): 80 TABLET, CHEWABLE ORAL at 17:39

## 2023-12-13 RX ADMIN — Medication 600 MILLIGRAM(S): at 11:32

## 2023-12-13 RX ADMIN — ENOXAPARIN SODIUM 40 MILLIGRAM(S): 100 INJECTION SUBCUTANEOUS at 11:32

## 2023-12-13 RX ADMIN — Medication 600 MILLIGRAM(S): at 18:30

## 2023-12-13 RX ADMIN — Medication 600 MILLIGRAM(S): at 00:34

## 2023-12-13 NOTE — PROGRESS NOTE ADULT - ASSESSMENT
A/P: 33y s/p  section, POD#2, w/ PEC w/o SF and chorio stable  -  Pain: PO motrin q6hrs, tylenol q8hrs, oxycodone for severe pain PRN  -  Post-operatively labs: post-op Hgb , hemodynamically stable, no symptoms of anemia   -  GI: tolerating regular diet, passing gas  -  : s/p fischer , urinating without difficulty  -  DVT prophylaxis: encouraged increased ambulation, SCDs, SQL  -  Dispo: POD 3 or 4    PEC w/o SF  -no toxic complaints  -normotensive    Chorio  -afebrile  -s/p A/G/C A/P: 33y s/p  section, POD#2, w/ PEC w/o SF and chorioamnioitis stable  -  Pain: PO motrin q6hrs, tylenol q8hrs, oxycodone for severe pain PRN  -  Post-operatively labs: post-op Hgb , hemodynamically stable, no symptoms of anemia   -  GI: tolerating regular diet, passing gas  -  : s/p fischer , urinating without difficulty  -  DVT prophylaxis: encouraged increased ambulation, SCDs, SQL  -  Dispo: POD 3 or 4    PEC w/o SF  -no toxic complaints  -normotensive    Chorioamniotis  -afebrile  -s/p A/G/C

## 2023-12-13 NOTE — PROGRESS NOTE ADULT - ASSESSMENT
A/P 33y s/p c/s, POD # 2 , meeting postop milestones    asx acute blood loss anemia  Diet: reg diet  Pain: OPM  IV fluid: po hydration  OOB/SCDs/IS  Continue to monitor  Anticipate discharge POD #3 or #4

## 2023-12-14 ENCOUNTER — TRANSCRIPTION ENCOUNTER (OUTPATIENT)
Age: 33
End: 2023-12-14

## 2023-12-14 VITALS
TEMPERATURE: 98 F | OXYGEN SATURATION: 98 % | HEART RATE: 85 BPM | DIASTOLIC BLOOD PRESSURE: 86 MMHG | RESPIRATION RATE: 17 BRPM | SYSTOLIC BLOOD PRESSURE: 129 MMHG

## 2023-12-14 PROCEDURE — 84156 ASSAY OF PROTEIN URINE: CPT

## 2023-12-14 PROCEDURE — 80053 COMPREHEN METABOLIC PANEL: CPT

## 2023-12-14 PROCEDURE — 36415 COLL VENOUS BLD VENIPUNCTURE: CPT

## 2023-12-14 PROCEDURE — 82570 ASSAY OF URINE CREATININE: CPT

## 2023-12-14 PROCEDURE — 85384 FIBRINOGEN ACTIVITY: CPT

## 2023-12-14 PROCEDURE — 83615 LACTATE (LD) (LDH) ENZYME: CPT

## 2023-12-14 PROCEDURE — 85025 COMPLETE CBC W/AUTO DIFF WBC: CPT

## 2023-12-14 PROCEDURE — 86780 TREPONEMA PALLIDUM: CPT

## 2023-12-14 PROCEDURE — 85730 THROMBOPLASTIN TIME PARTIAL: CPT

## 2023-12-14 PROCEDURE — 86900 BLOOD TYPING SEROLOGIC ABO: CPT

## 2023-12-14 PROCEDURE — 86901 BLOOD TYPING SEROLOGIC RH(D): CPT

## 2023-12-14 PROCEDURE — 59050 FETAL MONITOR W/REPORT: CPT

## 2023-12-14 PROCEDURE — 85610 PROTHROMBIN TIME: CPT

## 2023-12-14 PROCEDURE — 84550 ASSAY OF BLOOD/URIC ACID: CPT

## 2023-12-14 PROCEDURE — C1889: CPT

## 2023-12-14 PROCEDURE — 88307 TISSUE EXAM BY PATHOLOGIST: CPT

## 2023-12-14 PROCEDURE — C1765: CPT

## 2023-12-14 PROCEDURE — 86850 RBC ANTIBODY SCREEN: CPT

## 2023-12-14 RX ORDER — IBUPROFEN 200 MG
1 TABLET ORAL
Qty: 0 | Refills: 0 | DISCHARGE
Start: 2023-12-14

## 2023-12-14 RX ORDER — ACETAMINOPHEN 500 MG
3 TABLET ORAL
Qty: 0 | Refills: 0 | DISCHARGE
Start: 2023-12-14

## 2023-12-14 RX ADMIN — Medication 600 MILLIGRAM(S): at 06:01

## 2023-12-14 RX ADMIN — Medication 975 MILLIGRAM(S): at 08:24

## 2023-12-14 RX ADMIN — Medication 975 MILLIGRAM(S): at 02:00

## 2023-12-14 NOTE — DISCHARGE NOTE OB - PLAN OF CARE
- Follow up with your OBGYN in 1 week for a blood pressure check.  - Purchase electronic blood pressure cuff at your local pharmacy. Check blood pressure 3x per day.  - If systolic BP (top number) is greater than 160 OR diastolic BP (bottom number) is greater than 110, you develop a headache not relieved by Tylenol, visual disturbances, or right upper abdominal pain, then call your OBGYN or the hospital, or go to your nearest emergency room.  - Regular diet and normal activity as tolerated.  - Nothing in the vagina for 6 weeks (i.e., no sex, tampons, tub baths, or swimming pools) - Take Motrin 600mg every 6 hours and/or Tylenol 650mg every 6 hours as needed for pain  - Keep incision site clean and dry.  - Call your OBGYN to schedule a follow up appointment in 1-2 weeks.  - Call your OBGYN if you experiences severe abdominal pain not improved by oral pain medications, heavy bright red vaginal bleeding saturating more than 1 pad per hour, redness/warmth at incision site, drainage from incision site, or fever greater than 100.4F.

## 2023-12-14 NOTE — PROGRESS NOTE ADULT - SUBJECTIVE AND OBJECTIVE BOX
Patient evaluated at bedside this morning, resting comfortable in bed.   She reports pain is well controlled with oral pain medications.   She denies headache, dizziness, chest pain, palpitations, shortness of breath, nausea, vomiting or heavy vaginal bleeding.  She has been ambulating without assistance, voiding spontaneously, passing gas, tolerating regular diet.     Physical Exam:  Vital Signs Last 24 Hrs  T(C): 36.6 (13 Dec 2023 02:00), Max: 37.1 (12 Dec 2023 10:00)  T(F): 97.8 (13 Dec 2023 02:00), Max: 98.8 (12 Dec 2023 10:00)  HR: 87 (13 Dec 2023 02:00) (77 - 97)  BP: 109/72 (13 Dec 2023 02:00) (93/66 - 122/78)  BP(mean): --  RR: 18 (13 Dec 2023 02:00) (17 - 18)  SpO2: 97% (13 Dec 2023 02:00) (96% - 98%)    Parameters below as of 12 Dec 2023 21:45  Patient On (Oxygen Delivery Method): room air        GA: NAD, A+0 x 3  Pulm: no increased WOB  Abd: soft, nontender, nondistended, no rebound or guarding, incision clean, dry and intact, uterus firm at midline, 2 fb below umbilicus  Extremities: no swelling or calf tenderness                             9.6    18.11 )-----------( 217      ( 12 Dec 2023 05:30 )             28.5     12-11    132<L>  |  100  |  7   ----------------------------<  78  3.8   |  20<L>  |  0.54    Ca    9.3      11 Dec 2023 14:23    TPro  6.4  /  Alb  3.1<L>  /  TBili  0.4  /  DBili  x   /  AST  31  /  ALT  56<H>  /  AlkPhos  301<H>  12-11      PT/INR - ( 11 Dec 2023 14:23 )   PT: 10.4 sec;   INR: 0.91          PTT - ( 11 Dec 2023 14:23 )  PTT:27.1 sec  
Patient evaluated at bedside. No acute events overnight. Reports pain is well controlled. Rm still in place. Tolerated po. Denies n/v/f/c, cp, or dyspnea.       Physical Exam:  Vital Signs Last 24 Hrs  T(C): 37.1 (12 Dec 2023 10:00), Max: 38.6 (12 Dec 2023 00:15)  T(F): 98.8 (12 Dec 2023 10:00), Max: 101.5 (12 Dec 2023 00:15)  HR: 85 (12 Dec 2023 10:00) (77 - 116)  BP: 100/67 (12 Dec 2023 10:00) (100/67 - 135/81)  BP(mean): 86 (12 Dec 2023 01:10) (86 - 86)  RR: 17 (12 Dec 2023 10:00) (16 - 18)  SpO2: 96% (12 Dec 2023 10:00) (96% - 100%)    Parameters below as of 12 Dec 2023 10:00  Patient On (Oxygen Delivery Method): room air        GA: NAD, A+0 x 3  Abd: + BS, soft, nontender, nondistended, no rebound or guarding, uterus firm at midline, 2 fb below umbilicus  Incision clean, dry and intact  : lochia WNL  Extremities: no swelling or calf tenderness                            9.6    18.11 )-----------( 217      ( 12 Dec 2023 05:30 )             28.5     12-11    132<L>  |  100  |  7   ----------------------------<  78  3.8   |  20<L>  |  0.54    Ca    9.3      11 Dec 2023 14:23    TPro  6.4  /  Alb  3.1<L>  /  TBili  0.4  /  DBili  x   /  AST  31  /  ALT  56<H>  /  AlkPhos  301<H>  12-11      PT/INR - ( 11 Dec 2023 14:23 )   PT: 10.4 sec;   INR: 0.91          PTT - ( 11 Dec 2023 14:23 )  PTT:27.1 sec  
Patient evaluated at bedside. No acute events overnight. She reports pain is well controlled with OPM. . She denies headache, dizziness, chest pain, palpitations, shortness of breath, nausea, vomiting or heavy vaginal bleeding.      Physical Exam:  Vital Signs Last 24 Hrs  T(C): 36.8 (14 Dec 2023 09:00), Max: 36.8 (14 Dec 2023 06:23)  T(F): 98.2 (14 Dec 2023 09:00), Max: 98.2 (14 Dec 2023 06:23)  HR: 85 (14 Dec 2023 09:00) (73 - 88)  BP: 129/86 (14 Dec 2023 09:00) (103/69 - 129/86)  BP(mean): --  RR: 17 (14 Dec 2023 09:00) (16 - 18)  SpO2: 98% (14 Dec 2023 09:00) (95% - 98%)    Parameters below as of 14 Dec 2023 06:23  Patient On (Oxygen Delivery Method): room air        GA: NAD, A+0 x 3  Abd: + BS, soft, nontender, nondistended, no rebound or guarding, uterus firm at midline, 2 fb below umbilicus  Incision clean, dry and intact  : lochia WNL  Extremities: no calf tenderness    A/P  yo 33y s/p c/s, POD #3  ,stable    Diet: regular  Pain: OPM  OOB/SCDs/IS  Continue to monitor  Anticipate discharge today, precautions reviewed              
Patient evaluated at bedside. No acute events overnight. She reports pain is well controlled with OPM. Pt is ambulating, voiding, tolerating po. Reports having bm.  Denies n/v/f/c, cp, or dyspnea.       Physical Exam:  Vital Signs Last 24 Hrs  T(C): 36.5 (13 Dec 2023 06:00), Max: 36.6 (12 Dec 2023 14:00)  T(F): 97.7 (13 Dec 2023 06:00), Max: 97.9 (12 Dec 2023 14:00)  HR: 72 (13 Dec 2023 06:00) (72 - 97)  BP: 104/67 (13 Dec 2023 06:00) (93/66 - 109/72)  BP(mean): --  RR: 18 (13 Dec 2023 06:00) (18 - 18)  SpO2: 95% (13 Dec 2023 06:00) (95% - 98%)    Parameters below as of 12 Dec 2023 21:45  Patient On (Oxygen Delivery Method): room air        GA: NAD, A+0 x 3  Abd: + BS, soft, nontender, nondistended, no rebound or guarding, uterus firm at midline, 2 fb below umbilicus  Incision clean, dry and intact  : lochia WNL  Extremities: no swelling or calf tenderness                            9.6    18.11 )-----------( 217      ( 12 Dec 2023 05:30 )             28.5     12-11    132<L>  |  100  |  7   ----------------------------<  78  3.8   |  20<L>  |  0.54    Ca    9.3      11 Dec 2023 14:23    TPro  6.4  /  Alb  3.1<L>  /  TBili  0.4  /  DBili  x   /  AST  31  /  ALT  56<H>  /  AlkPhos  301<H>  12-11      PT/INR - ( 11 Dec 2023 14:23 )   PT: 10.4 sec;   INR: 0.91          PTT - ( 11 Dec 2023 14:23 )  PTT:27.1 sec  
Patient evaluated at bedside this morning, resting comfortable in bed, with no acute events overnight. Patient denies current back pain/muscle spasms.  She reports pain is well controlled with oral pain medications.   She denies headache, dizziness, chest pain, palpitations, shortness of breath, nausea, vomiting or heavy vaginal bleeding.  She has not tried ambulating since procedure, fischer remains in place at this time. Tolerating clear liquids.     Physical Exam:  Vital Signs Last 24 Hrs  T(C): 38.2 (12 Dec 2023 01:10), Max: 38.6 (12 Dec 2023 00:15)  T(F): 100.8 (12 Dec 2023 01:10), Max: 101.5 (12 Dec 2023 00:15)  HR: 85 (12 Dec 2023 01:10) (85 - 116)  BP: 111/74 (12 Dec 2023 01:10) (111/74 - 135/81)  BP(mean): 86 (12 Dec 2023 01:10) (86 - 86)  RR: 18 (12 Dec 2023 01:10) (16 - 18)  SpO2: 99% (12 Dec 2023 01:10) (98% - 100%)    Parameters below as of 12 Dec 2023 01:10  Patient On (Oxygen Delivery Method): room air        GA: NAD, A+0 x 3  Pulm: no increased WOB  Abd: soft, nontender, nondistended, no rebound or guarding, incision clean, dry and intact, uterus firm at midline, 2 fb below umbilicus  : fischer in situ, lochia WNL  Extremities: no swelling or calf tenderness, SCDs in place                            11.7   13.02 )-----------( 272      ( 11 Dec 2023 14:23 )             33.7     12-11    132<L>  |  100  |  7   ----------------------------<  78  3.8   |  20<L>  |  0.54    Ca    9.3      11 Dec 2023 14:23    TPro  6.4  /  Alb  3.1<L>  /  TBili  0.4  /  DBili  x   /  AST  31  /  ALT  56<H>  /  AlkPhos  301<H>  12-11      PT/INR - ( 11 Dec 2023 14:23 )   PT: 10.4 sec;   INR: 0.91          PTT - ( 11 Dec 2023 14:23 )  PTT:27.1 sec  acetaminophen     Tablet .. 975 milliGRAM(s) Oral <User Schedule>  albuterol    90 MICROgram(s) HFA Inhaler 2 Puff(s) Inhalation every 6 hours PRN  ampicillin  IVPB 2 Gram(s) IV Intermittent every 6 hours  clindamycin IVPB 900 milliGRAM(s) IV Intermittent every 8 hours  dexAMETHasone  Injectable 4 milliGRAM(s) IV Push every 6 hours PRN  diphenhydrAMINE 25 milliGRAM(s) Oral every 6 hours PRN  diphtheria/tetanus/pertussis (acellular) Vaccine (Adacel) 0.5 milliLiter(s) IntraMuscular once  enoxaparin Injectable 40 milliGRAM(s) SubCutaneous every 24 hours  fentanyl (2 MICROgram(s)/mL) + bupivacaine 0.0625%  in 0.9% Sodium Chloride PCEA 250 milliLiter(s) Epidural PCA Continuous  ibuprofen  Tablet. 600 milliGRAM(s) Oral every 6 hours  ketorolac   Injectable 30 milliGRAM(s) IV Push every 6 hours  lactated ringers. 1000 milliLiter(s) IV Continuous <Continuous>  lanolin Ointment 1 Application(s) Topical every 6 hours PRN  magnesium hydroxide Suspension 30 milliLiter(s) Oral two times a day PRN  naloxone Injectable 0.1 milliGRAM(s) IV Push every 3 minutes PRN  ondansetron Injectable 4 milliGRAM(s) IV Push every 6 hours PRN  oxyCODONE    IR 5 milliGRAM(s) Oral every 3 hours PRN  oxyCODONE    IR 5 milliGRAM(s) Oral once PRN  oxytocin Infusion 333.333 milliUNIT(s)/Min IV Continuous <Continuous>  simethicone 80 milliGRAM(s) Chew every 4 hours PRN  
Patient evaluated at bedside this morning, resting comfortable in bed.   She reports pain is well controlled with oral pain medications.   She denies headache, dizziness, chest pain, palpitations, shortness of breath, nausea, vomiting or heavy vaginal bleeding.  She has been ambulating without assistance, voiding spontaneously, passing gas, tolerating regular diet.     Physical Exam:  Vital Signs Last 24 Hrs  T(C): 36.8 (14 Dec 2023 06:23), Max: 36.8 (14 Dec 2023 06:23)  T(F): 98.2 (14 Dec 2023 06:23), Max: 98.2 (14 Dec 2023 06:23)  HR: 83 (14 Dec 2023 06:23) (73 - 88)  BP: 112/74 (14 Dec 2023 06:23) (103/69 - 128/82)  BP(mean): --  RR: 16 (14 Dec 2023 06:23) (16 - 18)  SpO2: 95% (14 Dec 2023 06:23) (95% - 98%)    Parameters below as of 14 Dec 2023 06:23  Patient On (Oxygen Delivery Method): room air        GA: NAD, A+0 x 3  Pulm: no increased WOB  Abd: soft, nontender, nondistended, no rebound or guarding, incision clean, dry and intact, uterus firm at midline, 2 fb below umbilicus  Extremities: no swelling or calf tenderness

## 2023-12-14 NOTE — DISCHARGE NOTE OB - NS MD DC FALL RISK RISK
For information on Fall & Injury Prevention, visit: [email or web address] OR  [email or web address] OR  https://www.cdc.gov/steadi/patient.html For information on Fall & Injury Prevention, visit: [email or web address] OR  [email or web address] OR  https://www.cdc.gov/steadi/patient.html

## 2023-12-14 NOTE — DISCHARGE NOTE OB - HOSPITAL COURSE
DATE OF ADMISSION: 12/10/2023    DATE OF DISCHARGE: 2023    ADMITTING DIAGNOSIS: Intrauterine pregnancy     HOSPITAL COURSE: Patient was admitted to Labor and Delivery for management of labor. She was diagnosed with chorioamnionitis and treated with antibiotics. She however underwent  delivery. Patient’s postoperative course was complicated by preeclampsia without severe features. Otherwise, postpartum time was unremarkable and she remained hemodynamically stable and afebrile throughout. Upon discharge the patient is ambulating without assistance, voiding spontaneously, tolerating oral intake. Pain is well controlled with oral medication and vital signs are stable.    PROCEDURES PERFORMED: Low transverse  delivery.    COMPLICATIONS: None.    FINAL DIAGNOSIS: Status post primary/repeat low transverse cearean delivery.    DISCHARGE INSTRUCTIONS: Call for increased pain, fever, or bleeding.    DIET: Advance as tolerated.    ACTIVITY: Advance as tolerated. Pelvic rest for 6 weeks. Nothing to be inserted into the vagina for 6 weeks (i.e. no tampons, douche, or sex)    MEDICATIONS AND FOLLOWUP: As above.

## 2023-12-14 NOTE — PROGRESS NOTE ADULT - ASSESSMENT
A/P: 33y s/p  section, POD#3 w/ PEC w/o SF stable  -  Pain: PO motrin q6hrs, tylenol q8hrs, oxycodone for severe pain PRN  -  Post-operatively labs: post-op Hgb , hemodynamically stable, no symptoms of anemia   -  GI: tolerating regular diet, passing gas  -  : s/p fischer , urinating without difficulty  -  DVT prophylaxis: encouraged increased ambulation, SCDs, SQL  -  Dispo: POD 3 or 4    PEC w/o SF  -no toxic complaints  -normotensive

## 2023-12-14 NOTE — DISCHARGE NOTE OB - PATIENT PORTAL LINK FT
You can access the FollowMyHealth Patient Portal offered by [facility] by registering at the following website: [email or web address]. By joining [employer]’s FollowMyHealth portal, you will also be able to view your health information using other applications (apps) compatible with our system. You can access the FollowMyHealth Patient Portal offered by [facility] by registering at the following website: [email or web address]. By joining [employer]’s FollowMyHealth portal, you will also be able to view your health information using other applications (apps) compatible with our system.

## 2023-12-14 NOTE — DISCHARGE NOTE OB - CARE PLAN
1 Principal Discharge DX:	Term pregnancy delivered  Assessment and plan of treatment:	- Take Motrin 600mg every 6 hours and/or Tylenol 650mg every 6 hours as needed for pain  - Keep incision site clean and dry.  - Call your OBGYN to schedule a follow up appointment in 1-2 weeks.  - Call your OBGYN if you experiences severe abdominal pain not improved by oral pain medications, heavy bright red vaginal bleeding saturating more than 1 pad per hour, redness/warmth at incision site, drainage from incision site, or fever greater than 100.4F.  Secondary Diagnosis:	Preeclampsia  Assessment and plan of treatment:	- Follow up with your OBGYN in 1 week for a blood pressure check.  - Purchase electronic blood pressure cuff at your local pharmacy. Check blood pressure 3x per day.  - If systolic BP (top number) is greater than 160 OR diastolic BP (bottom number) is greater than 110, you develop a headache not relieved by Tylenol, visual disturbances, or right upper abdominal pain, then call your OBGYN or the hospital, or go to your nearest emergency room.  - Regular diet and normal activity as tolerated.  - Nothing in the vagina for 6 weeks (i.e., no sex, tampons, tub baths, or swimming pools)

## 2023-12-14 NOTE — DISCHARGE NOTE OB - CARE PROVIDER_API CALL
Mely Johnson  Obstetrics and Gynecology  [address] [place] [id number]  Phone: [phone]  Fax: [phone]  Follow Up Time:    Mely Johnson  Obstetrics and Gynecology  [address] [place] [id number]  Phone: [phone]  Fax: [phone]  Follow Up Time:

## 2023-12-18 LAB
SURGICAL PATHOLOGY STUDY: SIGNIFICANT CHANGE UP
SURGICAL PATHOLOGY STUDY: SIGNIFICANT CHANGE UP

## 2023-12-19 DIAGNOSIS — O36.63X0 MATERNAL CARE FOR EXCESSIVE FETAL GROWTH, THIRD TRIMESTER, NOT APPLICABLE OR UNSPECIFIED: ICD-10-CM

## 2023-12-19 DIAGNOSIS — J45.909 UNSPECIFIED ASTHMA, UNCOMPLICATED: ICD-10-CM

## 2023-12-19 DIAGNOSIS — Z88.2 ALLERGY STATUS TO SULFONAMIDES: ICD-10-CM

## 2023-12-19 DIAGNOSIS — Z34.83 ENCOUNTER FOR SUPERVISION OF OTHER NORMAL PREGNANCY, THIRD TRIMESTER: ICD-10-CM

## 2023-12-19 DIAGNOSIS — O32.4XX0 MATERNAL CARE FOR HIGH HEAD AT TERM, NOT APPLICABLE OR UNSPECIFIED: ICD-10-CM

## 2023-12-19 DIAGNOSIS — D62 ACUTE POSTHEMORRHAGIC ANEMIA: ICD-10-CM

## 2023-12-19 DIAGNOSIS — O48.0 POST-TERM PREGNANCY: ICD-10-CM

## 2023-12-19 DIAGNOSIS — Z3A.40 40 WEEKS GESTATION OF PREGNANCY: ICD-10-CM

## 2023-12-19 DIAGNOSIS — O41.1230 CHORIOAMNIONITIS, THIRD TRIMESTER, NOT APPLICABLE OR UNSPECIFIED: ICD-10-CM

## 2024-06-17 NOTE — OB RN INTRAOPERATIVE NOTE - NS_SKINCLOSURE _OBGYN_ALL_OB_DT
Called and spoke with legal guardian and scheduled a new patient appointment on 09/16/2024.      11-Dec-2023 22:50
